# Patient Record
Sex: FEMALE | Race: OTHER | Employment: UNEMPLOYED | URBAN - METROPOLITAN AREA
[De-identification: names, ages, dates, MRNs, and addresses within clinical notes are randomized per-mention and may not be internally consistent; named-entity substitution may affect disease eponyms.]

---

## 2022-08-09 ENCOUNTER — OFFICE VISIT (OUTPATIENT)
Dept: FAMILY MEDICINE CLINIC | Facility: CLINIC | Age: 57
End: 2022-08-09

## 2022-08-09 VITALS
OXYGEN SATURATION: 98 % | HEART RATE: 77 BPM | TEMPERATURE: 97 F | RESPIRATION RATE: 18 BRPM | DIASTOLIC BLOOD PRESSURE: 76 MMHG | WEIGHT: 160 LBS | SYSTOLIC BLOOD PRESSURE: 144 MMHG

## 2022-08-09 DIAGNOSIS — H61.892: ICD-10-CM

## 2022-08-09 DIAGNOSIS — H61.22 IMPACTED CERUMEN OF LEFT EAR: Primary | ICD-10-CM

## 2022-08-09 PROBLEM — I10 HTN (HYPERTENSION), BENIGN: Status: ACTIVE | Noted: 2022-08-09

## 2023-08-16 ENCOUNTER — OFFICE VISIT (OUTPATIENT)
Dept: INTERNAL MEDICINE CLINIC | Facility: CLINIC | Age: 58
End: 2023-08-16
Payer: MEDICAID

## 2023-08-16 ENCOUNTER — LAB ENCOUNTER (OUTPATIENT)
Dept: LAB | Facility: HOSPITAL | Age: 58
End: 2023-08-16
Attending: INTERNAL MEDICINE
Payer: MEDICAID

## 2023-08-16 VITALS
HEART RATE: 73 BPM | WEIGHT: 156 LBS | DIASTOLIC BLOOD PRESSURE: 74 MMHG | OXYGEN SATURATION: 97 % | SYSTOLIC BLOOD PRESSURE: 138 MMHG

## 2023-08-16 DIAGNOSIS — Z13.21 ENCOUNTER FOR VITAMIN DEFICIENCY SCREENING: ICD-10-CM

## 2023-08-16 DIAGNOSIS — I10 PRIMARY HYPERTENSION: ICD-10-CM

## 2023-08-16 DIAGNOSIS — Z12.31 SCREENING MAMMOGRAM FOR BREAST CANCER: ICD-10-CM

## 2023-08-16 DIAGNOSIS — Z13.1 DIABETES MELLITUS SCREENING: ICD-10-CM

## 2023-08-16 DIAGNOSIS — Z76.89 ESTABLISHING CARE WITH NEW DOCTOR, ENCOUNTER FOR: Primary | ICD-10-CM

## 2023-08-16 DIAGNOSIS — Z11.59 NEED FOR HEPATITIS C SCREENING TEST: ICD-10-CM

## 2023-08-16 DIAGNOSIS — Z76.89 ESTABLISHING CARE WITH NEW DOCTOR, ENCOUNTER FOR: ICD-10-CM

## 2023-08-16 DIAGNOSIS — Z12.11 ENCOUNTER FOR SCREENING FECAL OCCULT BLOOD TESTING: ICD-10-CM

## 2023-08-16 LAB
ALBUMIN SERPL-MCNC: 4.3 G/DL (ref 3.4–5)
ALBUMIN/GLOB SERPL: 1.2 {RATIO} (ref 1–2)
ALP LIVER SERPL-CCNC: 99 U/L
ALT SERPL-CCNC: 34 U/L
ANION GAP SERPL CALC-SCNC: 9 MMOL/L (ref 0–18)
AST SERPL-CCNC: 22 U/L (ref 15–37)
BASOPHILS # BLD AUTO: 0.03 X10(3) UL (ref 0–0.2)
BASOPHILS NFR BLD AUTO: 0.4 %
BILIRUB SERPL-MCNC: 0.3 MG/DL (ref 0.1–2)
BUN BLD-MCNC: 11 MG/DL (ref 7–18)
BUN/CREAT SERPL: 13.6 (ref 10–20)
CALCIUM BLD-MCNC: 9.7 MG/DL (ref 8.5–10.1)
CHLORIDE SERPL-SCNC: 108 MMOL/L (ref 98–112)
CHOLEST SERPL-MCNC: 278 MG/DL (ref ?–200)
CO2 SERPL-SCNC: 24 MMOL/L (ref 21–32)
CREAT BLD-MCNC: 0.81 MG/DL
DEPRECATED RDW RBC AUTO: 41.2 FL (ref 35.1–46.3)
EGFRCR SERPLBLD CKD-EPI 2021: 83 ML/MIN/1.73M2 (ref 60–?)
EOSINOPHIL # BLD AUTO: 0.13 X10(3) UL (ref 0–0.7)
EOSINOPHIL NFR BLD AUTO: 1.8 %
ERYTHROCYTE [DISTWIDTH] IN BLOOD BY AUTOMATED COUNT: 13.2 % (ref 11–15)
EST. AVERAGE GLUCOSE BLD GHB EST-MCNC: 108 MG/DL (ref 68–126)
FASTING PATIENT LIPID ANSWER: YES
FASTING STATUS PATIENT QL REPORTED: YES
GLOBULIN PLAS-MCNC: 3.6 G/DL (ref 2.8–4.4)
GLUCOSE BLD-MCNC: 108 MG/DL (ref 70–99)
HBA1C MFR BLD: 5.4 % (ref ?–5.7)
HCT VFR BLD AUTO: 41.9 %
HCV AB SERPL QL IA: NONREACTIVE
HDLC SERPL-MCNC: 56 MG/DL (ref 40–59)
HGB BLD-MCNC: 13.5 G/DL
IMM GRANULOCYTES # BLD AUTO: 0.02 X10(3) UL (ref 0–1)
IMM GRANULOCYTES NFR BLD: 0.3 %
LDLC SERPL CALC-MCNC: 155 MG/DL (ref ?–100)
LYMPHOCYTES # BLD AUTO: 3.82 X10(3) UL (ref 1–4)
LYMPHOCYTES NFR BLD AUTO: 51.9 %
MCH RBC QN AUTO: 28 PG (ref 26–34)
MCHC RBC AUTO-ENTMCNC: 32.2 G/DL (ref 31–37)
MCV RBC AUTO: 86.7 FL
MONOCYTES # BLD AUTO: 0.47 X10(3) UL (ref 0.1–1)
MONOCYTES NFR BLD AUTO: 6.4 %
NEUTROPHILS # BLD AUTO: 2.89 X10 (3) UL (ref 1.5–7.7)
NEUTROPHILS # BLD AUTO: 2.89 X10(3) UL (ref 1.5–7.7)
NEUTROPHILS NFR BLD AUTO: 39.2 %
NONHDLC SERPL-MCNC: 222 MG/DL (ref ?–130)
OSMOLALITY SERPL CALC.SUM OF ELEC: 292 MOSM/KG (ref 275–295)
PLATELET # BLD AUTO: 252 10(3)UL (ref 150–450)
POTASSIUM SERPL-SCNC: 4 MMOL/L (ref 3.5–5.1)
PROT SERPL-MCNC: 7.9 G/DL (ref 6.4–8.2)
RBC # BLD AUTO: 4.83 X10(6)UL
SODIUM SERPL-SCNC: 141 MMOL/L (ref 136–145)
TRIGL SERPL-MCNC: 359 MG/DL (ref 30–149)
VIT D+METAB SERPL-MCNC: 18.6 NG/ML (ref 30–100)
VLDLC SERPL CALC-MCNC: 71 MG/DL (ref 0–30)
WBC # BLD AUTO: 7.4 X10(3) UL (ref 4–11)

## 2023-08-16 PROCEDURE — 80061 LIPID PANEL: CPT | Performed by: INTERNAL MEDICINE

## 2023-08-16 PROCEDURE — 86803 HEPATITIS C AB TEST: CPT | Performed by: INTERNAL MEDICINE

## 2023-08-16 PROCEDURE — 83036 HEMOGLOBIN GLYCOSYLATED A1C: CPT | Performed by: INTERNAL MEDICINE

## 2023-08-16 PROCEDURE — 82306 VITAMIN D 25 HYDROXY: CPT

## 2023-08-16 PROCEDURE — 85025 COMPLETE CBC W/AUTO DIFF WBC: CPT | Performed by: INTERNAL MEDICINE

## 2023-08-16 PROCEDURE — 80053 COMPREHEN METABOLIC PANEL: CPT

## 2023-08-16 PROCEDURE — 36415 COLL VENOUS BLD VENIPUNCTURE: CPT | Performed by: INTERNAL MEDICINE

## 2023-08-16 RX ORDER — LISINOPRIL 10 MG/1
10 TABLET ORAL DAILY
COMMUNITY

## 2023-08-28 NOTE — PROGRESS NOTES
[address] [place] [address] Patient History and Physical      HPI:   Patient presents with:  [address] [facility] Darrian Elkins is a 59year old female presenting for:  Establish care. Has  has a past medical history of Essential hypertension. Overall patient is here to establish care. Results for orders placed or performed in visit on 08/16/23   HEMOGLOBIN A1C   Result Value Ref Range    HgbA1C 5.4 <5.7 %    Estimated Average Glucose 108 68 - 126 mg/dL   LIPID PANEL   Result Value Ref Range    Cholesterol, Total 278 (H) <200 mg/dL    HDL Cholesterol 56 40 - 59 mg/dL    Triglycerides 359 (H) 30 - 149 mg/dL    LDL Cholesterol 155 (H) <100 mg/dL    VLDL 71 (H) 0 - 30 mg/dL    Non HDL Chol 222 (H) <130 mg/dL    Patient Fasting for Lipid?  Yes    HCV ANTIBODY   Result Value Ref Range    Hepatitis C Virus Nonreactive Nonreactive    CBC W/ DIFFERENTIAL   Result Value Ref Range    WBC 7.4 4.0 - 11.0 x10(3) uL    RBC 4.83 3.80 - 5.30 x10(6)uL    HGB 13.5 12.0 - 16.0 g/dL    HCT 41.9 35.0 - 48.0 %    MCV 86.7 80.0 - 100.0 fL    MCH 28.0 26.0 - 34.0 pg    MCHC 32.2 31.0 - 37.0 g/dL    RDW-SD 41.2 35.1 - 46.3 fL    RDW 13.2 11.0 - 15.0 %    .0 150.0 - 450.0 10(3)uL    Neutrophil Absolute Prelim 2.89 1.50 - 7.70 x10 (3) uL    Neutrophil Absolute 2.89 1.50 - 7.70 x10(3) uL    Lymphocyte Absolute 3.82 1.00 - 4.00 x10(3) uL    Monocyte Absolute 0.47 0.10 - 1.00 x10(3) uL    Eosinophil Absolute 0.13 0.00 - 0.70 x10(3) uL    Basophil Absolute 0.03 0.00 - 0.20 x10(3) uL    Immature Granulocyte Absolute 0.02 0.00 - 1.00 x10(3) uL    Neutrophil % 39.2 %    Lymphocyte % 51.9 %    Monocyte % 6.4 %    Eosinophil % 1.8 %    Basophil % 0.4 %    Immature Granulocyte % 0.3 %             Labs:   Complete Metabolic Panel:  Lab Results   Component Value Date/Time     08/16/2023 05:26 PM    K 4.0 08/16/2023 05:26 PM     08/16/2023 05:26 PM    CO2 24.0 08/16/2023 05:26 PM    CREATSERUM 0.81 08/16/2023 05:26 PM    CA 9.7 08/16/2023 05:26 PM     (H) 08/16/2023 05:26 PM    TP 7.9 08/16/2023 05:26 PM    ALB 4.3 08/16/2023 05:26 PM    ALKPHO 99 08/16/2023 05:26 PM    AST 22 08/16/2023 05:26 PM    ALT 34 08/16/2023 05:26 PM    BILT 0.3 08/16/2023 05:26 PM        Hemoglobin A1C, Microalbumin  Lab Results   Component Value Date/Time    A1C 5.4 08/16/2023 05:26 PM        Lipid panel  Lab Results   Component Value Date/Time    CHOLEST 278 (H) 08/16/2023 05:26 PM    HDL 56 08/16/2023 05:26 PM    TRIG 359 (H) 08/16/2023 05:26 PM     (H) 08/16/2023 05:26 PM    NONHDLC 222 (H) 08/16/2023 05:26 PM     The 10-year ASCVD risk score (Yarely VILLEGAS, et al., 2019) is: 6.5%    Values used to calculate the score:      Age: 59 years      Sex: Female      Is Non- : No      Diabetic: No      Tobacco smoker: No      Systolic Blood Pressure: 506 mmHg      Is BP treated: Yes      HDL Cholesterol: 56 mg/dL      Total Cholesterol: 278 mg/dL       Medications:  Current Outpatient Medications   Medication Sig Dispense Refill    lisinopril 10 MG Oral Tab Take 1 tablet (10 mg total) by mouth daily. LOSARTAN POTASSIUM OR Take by mouth.         PMH:  Past Medical History:   Diagnosis Date    Essential hypertension          PSH:  Past Surgical History:   Procedure Laterality Date    REMOVAL GALLBLADDER         Allergies:  No Known Allergies   Social History:  Social History     Socioeconomic History    Marital status:    Tobacco Use    Smoking status: Never    Smokeless tobacco: Never   Vaping Use    Vaping Use: Never used   Substance and Sexual Activity    Alcohol use: Not Currently    Drug use: Never        Family History:  Family History   Problem Relation Age of Onset    No Known Problems Father     Hypertension Mother     No Known Problems Daughter     No Known Problems Daughter     No Known Problems Daughter     No Known Problems Daughter     No Known Problems Son           REVIEW OF SYSTEMS:   Review of Systems   Constitutional:  Negative for chills, fatigue, fever and unexpected weight change. HENT:  Negative for congestion, ear pain, hearing loss, rhinorrhea, sinus pain and sore throat. Eyes:  Negative for pain, redness and visual disturbance. Respiratory:  Negative for apnea, cough, chest tightness, shortness of breath and wheezing. Cardiovascular:  Negative for chest pain, palpitations and leg swelling. Gastrointestinal:  Negative for abdominal distention, abdominal pain, blood in stool, constipation and nausea. Endocrine: Negative for cold intolerance, heat intolerance and polyuria. Genitourinary:  Negative for dysuria, hematuria and urgency. Musculoskeletal:  Negative for arthralgias, back pain, gait problem, joint swelling, myalgias and neck pain. Skin:  Negative for rash and wound. Allergic/Immunologic: Negative for food allergies and immunocompromised state. Neurological:  Negative for dizziness, seizures, facial asymmetry, speech difficulty, weakness, light-headedness, numbness and headaches. Hematological:  Negative for adenopathy. Does not bruise/bleed easily. Psychiatric/Behavioral:  Negative for behavioral problems, sleep disturbance and suicidal ideas. The patient is not nervous/anxious. PHYSICAL EXAM:   /74 (BP Location: Right arm, Patient Position: Sitting, Cuff Size: adult)   Pulse 73   Wt 156 lb (70.8 kg)   SpO2 97%  There is no height or weight on file to calculate BMI. Wt Readings from Last 3 Encounters:  08/16/23 : 156 lb (70.8 kg)  08/09/22 : 160 lb (72.6 kg)      Physical Exam  Vitals reviewed. Constitutional:       General: She is not in acute distress. Appearance: She is well-developed. HENT:      Head: Normocephalic and atraumatic. Eyes:      Conjunctiva/sclera: Conjunctivae normal.      Pupils: Pupils are equal, round, and reactive to light. Neck:      Thyroid: No thyromegaly.    Cardiovascular:      Rate and Rhythm: Normal rate and regular rhythm. Heart sounds: Normal heart sounds, S1 normal and S2 normal. No murmur heard. No friction rub. No gallop. Pulmonary:      Effort: Pulmonary effort is normal. No respiratory distress. Breath sounds: Normal breath sounds. No wheezing or rales. Chest:      Chest wall: No tenderness. Abdominal:      General: Bowel sounds are normal. There is no distension. Palpations: Abdomen is soft. There is no mass. Tenderness: There is no abdominal tenderness. There is no guarding or rebound. Musculoskeletal:         General: No tenderness. Normal range of motion. Cervical back: Normal range of motion. Lymphadenopathy:      Cervical: No cervical adenopathy. Skin:     General: Skin is warm. Findings: No erythema or rash. Neurological:      Mental Status: She is alert and oriented to person, place, and time. Cranial Nerves: No cranial nerve deficit. Deep Tendon Reflexes: Reflexes are normal and symmetric. Psychiatric:         Behavior: Behavior normal.         Thought Content:  Thought content normal.         Judgment: Judgment normal.             ASSESSMENT AND PLAN:   Patient is a 59year old female who presents primarily presents for:    (Z76.89) Establishing care with new doctor, encounter for  (primary encounter diagnosis)  Plan: CBC WITH DIFFERENTIAL WITH PLATELET, COMP         METABOLIC PANEL (14), LIPID PANEL            (I10) Primary hypertension  Plan:     (Z13.21) Encounter for vitamin deficiency screening  Plan: VITAMIN D            (Z13.1) Diabetes mellitus screening  Plan: HEMOGLOBIN A1C            (Z11.59) Need for hepatitis C screening test  Plan: HCV ANTIBODY            (Z12.31) Screening mammogram for breast cancer  Plan: [facility] 2D+3D SCREENING BILAT         (CPT=77067/12508)            (Z12.11) Encounter for screening fecal occult blood testing  Plan: OCCULT BLOOD, FECAL, FIT [facility] Maintenance:  Colorectal Cancer Screening Never done  Annual Physical Never done  Mammogram Never done  COVID-19 Vaccine(1) Never done  DTaP,Tdap,and Td Vaccines(1 - Tdap) Never done  Zoster Vaccines(1 of 2) Never done  Annual Depression Screening Never done  Influenza Vaccine(1) due on 10/01/2023  Pneumococcal Vaccine: Birth to 56yrs Aged Out          Meds & Refills for this Visit:  Requested Prescriptions      No prescriptions requested or ordered in this encounter       Orders Placed This Encounter      CBC With Differential With Platelet      Comp Metabolic Panel (14)      Hemoglobin A1C      Lipid Panel      HCV Antibody      Vitamin D      Occult Blood, Fecal, Immunoassay (Blue cards) [E]      Imaging & Consults:  [facility] 2D+3D SCREENING BILAT (CPT=77067/01559)        Chaitanya Lopez MD     Return in about 6 months (around 2/16/2024) for Symptom monitoring. Important issues to follow up on next visit      Patient indicates understanding of the above recommendations and agrees to the above plan. Reasurrance and education provided. All questions answered. Notified to call with any questions, complications, allergies, or worsening or changing symptoms as well as any side effects or complications from the treatments . Red flags/ ER precautions discussed.       Makayla Mark MD  Internal Medicine/Primary Care  [place] [phone]

## 2023-09-05 ENCOUNTER — TELEPHONE (OUTPATIENT)
Dept: INTERNAL MEDICINE CLINIC | Facility: CLINIC | Age: 58
End: 2023-09-05

## 2023-09-05 DIAGNOSIS — E78.00 HYPERCHOLESTEROLEMIA: Primary | ICD-10-CM

## 2023-09-05 NOTE — TELEPHONE ENCOUNTER
Results were discussed with patient and is aware she needs to get labs done again in 6 months and to see the doctor after to review results,order was placed to get lipid panel prior to the visit, recommendations about her diet was given and was advised to use vit d supplements. ----- Message from Arelis Scales MD sent at 8/28/2023  6:09 PM CDT -----  Ramin An,    Your labs have been reviewed. Lipid panel was abnormal with elevated triglycerides as well as LDL. LDL (low-density lipoprotein) sometimes called \"bad\" cholesterol makes up the majority of your body's cholesterol. High levels of LDL raise your risk of cardiovascular disease including heart disease and stroke. Diets high in saturated fats, salts and cholesterol such as fatty meats, processed foods, dairy and cured meats can lead to elevated LDL levels. Triglycerides are a type of fat (lipid) found in your blood. Recommendations to lower triglycerides include avoiding sugars, refined carbohydrates and alcohol. Increasing foods higher in omega-3 such as fish, dark leafy green vegetables. Your kidney and liver function are normal.    Your vitamin D is slightly low. Please purchase vitamin D3 over-the-counter and take 400 units daily. Complete blood count is normal.    Please remember to complete your fecal occult blood test.  If you have any questions please not hesitate to let me know. We should repeat your lipid panel in 6 months. Please schedule a follow-up at your earliest convenience.